# Patient Record
(demographics unavailable — no encounter records)

---

## 2025-02-04 NOTE — HISTORY OF PRESENT ILLNESS
[FreeTextEntry1] : fast heart beat.  and   This is a 52 year old woman with history of hashimotos,  and now graves,  osteoporosis,  and bicuspid aortic valve, history of syncope here  for palptiaITONS. she was sick for 2 weeks.  and she was getting better,  adn worse, adn getting better and worse.  adn seh  went to [facility].  stephan BP was high.  adn she was feeling very dyspnea on exertion .  her heart rate was 171.  and exteremely dyspena.  her fit bit was giving noticicaitons that her heart rate was jogh. no syncope.  she was in hospital for fast heart beat.  and she was diagnosed with hyperthyrpoidism.  she had regualr sinus tachycarida.  not an arrhythmia.  and now on beta blocker shje is controlled. her is also on methimazole.  she is feleign dyspnea on exertion   when she takes laundry up  and also she h=is feelign palpitaitons.         No smoking. No alcohol. No drugs.  Smoking:   Quit:  1 week ago.   2025    Smoked for _30 years.   Smoked 1 pack a day for  30 years.   Family history:  Father:  no myocardial infarction. no Cerebro vascular accident   enlarged heart  at 47.   smoker.  probably MI and enlarged heart.  Multiple sclerosis.  Mother :  no myocardial infarction. No cerebro vascular accident  Siblings:  No myocardial infarction.  No CVA

## 2025-02-04 NOTE — DISCUSSION/SUMMARY
[Patient] : the patient [Risks] : risks [Benefits] : benefits [Alternatives] : alternatives [With Me] : with me [___ Year(s)] : in [unfilled] year(s) [FreeTextEntry1] : This is a 52 year old woman with history of hashimotos,  and now graves,  osteoporosis,  and bicuspid aortic valve, history of syncope here  for palptiaITONS.  1) dyspnea on exertion : history of smoking.  r/o coronary artery disease . intermediate risk factors for coronary artery disease. Stress echocardiogram with contrast if needed.   Nuclear stress test with IV technetium radiotracer.  smoking cessation.   if negatvie work up, then consider PFT check with pulmonary to evaluate for reactive ariwqay disease and COPD.  2) palpitations:  Sinu stachycarida.  hyperthyrpoidism.  Part of thyroidisits due to infeciton.  now on methimazole.  in the mean time.  we will switch metoprolol to propranolol until thyroid stabilized. she has apt with endocrinolgoist.  stop metoiprolol.  discussed about worsening COPD.  no definite diagnosis of COPD.  will give her albuterl prn.  if . 3) laura fisher.    [EKG obtained to assist in diagnosis and management of assessed problem(s)] : EKG obtained to assist in diagnosis and management of assessed problem(s)

## 2025-02-28 NOTE — REVIEW OF SYSTEMS
[Fatigue] : fatigue [Recent Weight Gain (___ Lbs)] : recent weight gain: [unfilled] lbs [Visual Field Defect] : no visual field defect [Dysphagia] : no dysphagia [Chest Pain] : no chest pain [Palpitations] : no palpitations [Shortness Of Breath] : no shortness of breath [Nausea] : no nausea [Constipation] : no constipation [Vomiting] : no vomiting [Diarrhea] : no diarrhea [Polyuria] : no polyuria [Polydipsia] : no polydipsia

## 2025-02-28 NOTE — HISTORY OF PRESENT ILLNESS
[FreeTextEntry1] : Patient is here today for HFU after being admitted to [facility] for hyperthyroidism.  Hospital Course: Discharge Date 29-Jan-2025 Admission Date 28-Jan-2025 18:35 Reason for Admission Thyrotoxicosis Hospital Course  Mrs. Person is a 52-year-old female with a PMH of hypothyroidism/Hashimoto presents to the ED for SOB. Endorses intermittent URI symptoms for the past 2 weeks, 5 days prior to admission symptoms worsened with productive cough, congestion, fever, was prescribed Augmentin and prednisone by PMD, was feeling better, but day prior to admission started to have SOB with exertion, chest tightness, palpitation, fatigue which prompted ED visit. Found to be tachycardic / tachypneic with elevated thyroid function panel TSH < .1, Total T3 239, T4 13.2, given lopressor with improvement in HR. Seen by Endocrine with recommendation for Thyroid US and methimazole 20 mg BID while holding patients Levothyroxine. AM Labs showed improvement in TFT's Free thyroxine 3.1, T4 11.3, T3 total 190, TSH remains < .1. 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  Interval HX: C/o SOB on exertion Denies palpitations Headaches every day  Gained +10-15 pounds [facility] ehospitalization  Medical Hx: PCP was taking care of thyroid. Yearly appointments After further investigation of previous lab work from [facility], it seems patient has been in Hyperactive stage since 6/2023. Labs 6/2023 TSH <0.005 Labs 5/14/2024 TSH <0.005, T4 12.8 T3 223 TPO >600 Dose was never changed by PCP  She has h/o Hashimoto's Disease/Hypothyroidism for about 20 years  Was started on LT4 125 mcg and then lowered to LT4 50 mcg about 5 years ago and has been on dose since  Family Hx: Mother hypothyroidism  Labs from Hospital: 1/29/2025: TSH >0.10, T4 11.3, FT4 3.1, TSI 4.46  Thyroid u/s 1/29/2025 Thyroiditis, no nodules  Current Regimen: MMI 10 mg QD Metoporolol 25 mg 1 tab BID   Osteoporosis -Managed by GYN -Was on Alendronate 70 mg weekly x 2-3 years. did not feel well -Has been off since 2024 -DEXA in 5/2024, shoed Osteoporosis -On Ca and Vit D supplements

## 2025-02-28 NOTE — ASSESSMENT
[Methimazole Therapy/PTU Therapy] : Risks and benefits of methimazole therapy/PTU therapy were discussed with the patient, including rash, liver dysfunction, and agranulocytosis. Patient was instructed to call the office for flu-like symptoms (e.g. fever and sore-throat) [FreeTextEntry1] : Hyperthyroidism -Repeat TFTs now -Continue MMI 10 mg QD and Beta Blocker -Close follow up needed -Discussed possible WEBB ablation of thyroid if unable to manage thyroid with medication   Osteoporosis -Declines medication treatment at this time like Prolia and Tymlos -Continue Vit D and Ca supplements -F/u with GYN    RTO in 6 weeks NP and 4 months MD

## 2025-04-18 NOTE — REVIEW OF SYSTEMS
[Fatigue] : no fatigue [Recent Weight Gain (___ Lbs)] : recent weight gain: [unfilled] lbs [Visual Field Defect] : no visual field defect [Dysphagia] : no dysphagia [Chest Pain] : no chest pain [Palpitations] : no palpitations [Shortness Of Breath] : no shortness of breath [Nausea] : nausea [Constipation] : constipation [Diarrhea] : no diarrhea [Polyuria] : no polyuria [Stress] : stress

## 2025-04-18 NOTE — ASSESSMENT
[Methimazole Therapy/PTU Therapy] : Risks and benefits of methimazole therapy/PTU therapy were discussed with the patient, including rash, liver dysfunction, and agranulocytosis. Patient was instructed to call the office for flu-like symptoms (e.g. fever and sore-throat) [FreeTextEntry1] : Hyperthyroidism -Repeat TFTs now -Continue MMI 10 mg QD -Stop Beta blocker for now (She wanted to stop either BB or MMI)  -Close follow up needed -Discussed possible WEBB ablation of thyroid if unable to manage thyroid with medication -Discussed Total Thyroidectomy  - Pt wants to see ENT for Total Thyroidectomy becuase she wants thyroid out can not go on like this -Very emotional and crying at visit. She does not Feel well -Wants to try PTU --> Will discuss with Dr. Zhou  -Declines need to see GI for Acid Reflux and Abd pain  -Encouraged to see PCO for acid reflux   Osteoporosis -Declines medication treatment at this time like Prolia and Tymlos -Continue Vit D and Ca supplements -F/u with GYN   Nba discuss case with Dr. Zhou   RTO in 6 weeks NP and 4 months MD

## 2025-04-18 NOTE — HISTORY OF PRESENT ILLNESS
[FreeTextEntry1] : Patient was admitted to [facility] for hyperthyroidism in 1/2025  Notes:  Mrs. Person is a 52-year-old female with a PMH of hypothyroidism/Hashimoto presents to the ED for SOB. Found to betachycardic / tachypneic with elevated thyroid function panel TSH < .1, Total T3 239, T4 13.2, given lopressor with improvement in HR.  Was Seen by Endocrine with recommendation for Thyroid US and methimazole 20 mg BID while holding patients Levothyroxine. AM Labs showed improvement in TFT's Free thyroxine 3.1, T4 11.3, T3 total 190, TSH remains < .1. Thyroid US reviewed with endocrine shows increased vascularity,   Interval HX: Headaches every day  Keeps gaining weight After increasing to MMI 15 mg last visit C/o Acid reflux, nausea, unable to bend over without pain and wanting to throw up  She mentally does not feel well, she is van, mean, not feeling herself  - Medicaton was decreased back to MMI 10 mg 2 weeks later Extreme Constipation  Medical Hx: PCP was taking care of thyroid. Yearly appointments After further investigation of previous lab work from [facility], it seems patient has been in Hyperactive stage since 6/2023. Labs 6/2023 TSH <0.005 Labs 5/14/2024 TSH <0.005, T4 12.8 T3 223 TPO >600 Dose was never changed by PCP  She has h/o Hashimoto's Disease/Hypothyroidism for about 20 years  Was started on LT4 125 mcg and then lowered to LT4 50 mcg about 5 years ago and has been on dose since  Family Hx: Mother hypothyroidism  Labs from Hospital: 1/29/2025: TSH >0.10, T4 11.3, FT4 3.1, TSI 4.46  Current TFTs: 4/9/2025 TSH 0.31 FT4 0.62 T4 4.2 T3 2.7  Thyroid u/s 1/29/2025 Thyroiditis, no nodules  Current Regimen: MMI 10 mg QD Metoprolol 25 mg 1 tab BID   Osteoporosis -Managed by GYN -Was on Alendronate 70 mg weekly x 2-3 years. did not feel well -Has been off since 2024 -DEXA in 5/2024, showed Osteoporosis -On Ca and Vit D supplements

## 2025-05-30 NOTE — HISTORY OF PRESENT ILLNESS
[FreeTextEntry1] : Patient was admitted to [facility] for hyperthyroidism in 1/2025  Notes:  Mrs. Person is a 52-year-old female with a PMH of hypothyroidism/Hashimoto presents to the ED for SOB. Found to betachycardic / tachypneic with elevated thyroid function panel TSH < .1, Total T3 239, T4 13.2, given lopressor with improvement in HR.  Was Seen by Endocrine with recommendation for Thyroid US and methimazole 20 mg BID while holding patients Levothyroxine. AM Labs showed improvement in TFT's Free thyroxine 3.1, T4 11.3, T3 total 190, TSH remains < .1. Thyroid US reviewed with endocrine shows increased vascularity,   Interval HX: She is feeling much better Headahes are happening less Mood has improved Some Constipation  Medical Hx: PCP was taking care of thyroid. Yearly appointments After further investigation of previous lab work from [facility], it seems patient has been in Hyperactive stage since 6/2023. Labs 6/2023 TSH <0.005 Labs 5/14/2024 TSH <0.005, T4 12.8 T3 223 TPO >600 Dose was never changed by PCP  She has h/o Hashimoto's Disease/Hypothyroidism for about 20 years  Was started on LT4 125 mcg and then lowered to LT4 50 mcg about 5 years ago and has been on dose since  Family Hx: Mother hypothyroidism   Current TFTs: 5/16/2025 TSH <0.005 FT4 1.15 FT3 4.8 PCP told her to STOP MMI for 3 days before labs    Thyroid u/s 1/29/2025 Thyroiditis, no nodules  Current Regimen: MMI 10 mg QD Metoprolol 25 mg 1 tab BID   Osteoporosis -Managed by GYN -Was on Alendronate 70 mg weekly x 2-3 years. did not feel well -Has been off since 2024 -DEXA in 5/2024, showed Osteoporosis -On Ca and Vit D supplements -Declines treatment

## 2025-05-30 NOTE — REASON FOR VISIT
[Follow - Up] : a follow-up visit [Hypothyroidism] : hypothyroidism [Home] : at home, [unfilled] , at the time of the visit. [Other Location: e.g. Home (Enter Location, City,State)___] : at [unfilled] [Telephone (audio)] : This telephonic visit was provided via audio only technology. [Verbal consent obtained from patient] : the patient, [unfilled]

## 2025-05-30 NOTE — REVIEW OF SYSTEMS
[Constipation] : constipation [Fatigue] : no fatigue [Visual Field Defect] : no visual field defect [Dysphagia] : no dysphagia [Chest Pain] : no chest pain [Palpitations] : no palpitations [Shortness Of Breath] : no shortness of breath [Nausea] : no nausea [Vomiting] : no vomiting [Polyuria] : no polyuria

## 2025-05-30 NOTE — ASSESSMENT
[FreeTextEntry1] : Hyperthyroidism -Continue MMI 10 mg QD -Close follow up needed -Discussed possible WEBB ablation of thyroid if unable to manage thyroid with medication -Do not stop medication for labs -Repeat TFTs in 4 weeks -Avoid alcohol  Osteoporosis -Declines medication treatment at this time like Prolia and Tymlos -Continue Vit D and Ca supplements -F/u with GYN   RTO in 8 weeks MD

## 2025-07-02 NOTE — PHYSICAL EXAM
[de-identified] : Exam [bilateral] wrist + Durkan's with + N/T. There is + tinel. Patient is able to delineate numbness to median-sided digits volarly. [No obvious thenar atrophy] Examination of the hand(s)  particularly at the A1 of the left small, right ring reveals tenderness with a palpable click.  [de-identified] : [4] views of [bilateral hands and wrists] were obtained today in my office and were seen by me and discussed with the patient.  These [show findings consistent with minimal bilateral basal joint OA and findings of IP joint OA]

## 2025-07-02 NOTE — ASSESSMENT
[FreeTextEntry1] : ASSESSMENT The patient comes in today with chronic exacerbated complaints of bilateral wrist and hand discomfort.  We have discussed carpal tunnel.  We have discussed tendinopathy and triggering of the fingers.  We have discussed bracing activity modification and surgical modalities as well.  Patient elects for injections today which should be very helpful   The patient was adequately and thoroughly informed of my assessment of their current condition(s).  - This may diminish bodily function for the extremity. We discussed prognosis, tx modalities including operative and nonoperative options for the above diagnostic assessment. As always, 2nd opinion is always provided as an option. When accessible, I was able to review other physicians note(s) including reviewing other tests, imaging results as well as personally view these results for my own interpretation.   *** Injection procedure for bilateral carpal tunnel: The risks and benefits of a steroid injection were discussed in detail. The risks include but are not limited to: pain, infection, swelling, flare response, bleeding, subcutaneous fat atrophy, skin depigmentation and/or elevation of blood sugar. The risk of incomplete resolution of symptoms, recurrence and additional intervention was reviewed and considered by the patient. The patient agreed to proceed and under a sterile prep, I injected 1 unit 6mg into 1 cc of a combination of Celestone and Lidocaine into the above stated location for the procedure. The patient tolerated the injection well.  *** Injection procedure for trigger tendon sheath x 2-left small trigger, right ring trigger: The risks and benefits of a steroid injection were discussed in detail. The risks include but are not limited to: pain, infection, swelling, flare response, bleeding, subcutaneous fat atrophy, skin depigmentation and/or elevation of blood sugar. The risk of incomplete resolution of symptoms, recurrence and additional intervention was reviewed and considered by the patient. The patient agreed to proceed and under a sterile prep, I injected 1 unit 6mg into 1 cc of a combination of Celestone and Lidocaine into the above stated location for the procedure. The patient tolerated the injection well.   The patient was adequately and thoroughly informed of my assessment of their current condition(s).   DISCUSSION: 1.  Injections as above.  Bracing activity modification.  Follow-up [6 weeks] 2. [x] 3. [x]

## 2025-07-02 NOTE — HISTORY OF PRESENT ILLNESS
[FreeTextEntry1] : This is a pleasant 53 year yo patient presenting today with a chronic history of bilateral wrist and hand discomfort.  Describes burning and numbness and tingling that wakes them up at night.  Describes stiffness and triggering of fingers as well.  It is affecting ADLs.

## 2025-07-30 NOTE — REVIEW OF SYSTEMS
[Constipation] : constipation [Recent Weight Gain (___ Lbs)] : recent weight gain: [unfilled] lbs [Nausea] : nausea [Fatigue] : no fatigue [Visual Field Defect] : no visual field defect [Dysphagia] : no dysphagia [Chest Pain] : no chest pain [Palpitations] : no palpitations [Shortness Of Breath] : no shortness of breath [Vomiting] : no vomiting [Polyuria] : no polyuria

## 2025-07-30 NOTE — HISTORY OF PRESENT ILLNESS
[FreeTextEntry1] : Zakiya is a 53 yr old female, here for hyperthyroidism, seen NP in past seeing me first time.  Patient was admitted to [facility] for hyperthyroidism in 1/2025  Past Notes:  ''Per patient she was diagnosed with hypothyroidism in 2003, and had been on levothyroxine till recently when she came to [facility]  in  1/25 for tachycardia and SOB. Mrs. Person is a 52-year-old female with a PMH of hypothyroidism/Hashimoto presents to the ED for SOB. Found to Tachycardic / tachypneic with elevated thyroid function panel TSH < .1, Total T3 239, T4 13.2, given lopressor with improvement in HR.  Was Seen by Endocrine with recommendation for Thyroid US and methimazole 20 mg BID while holding patients Levothyroxine. AM Labs showed improvement in TFT's Free thyroxine 3.1, T4 11.3, T3 total 190, TSH remains < .1. Thyroid US reviewed with endocrine shows increased vascularity.''  Interval HX: She is not feeling good, now has weight gain, headaches, nausea, tired, having hair loss. She has a lot of mood issues, anger issues. Does not feel good. She says she feels drastic change in her life.  Medical Hx: PCP was taking care of thyroid. Yearly appointments After further investigation of previous lab work from [facility], it seems patient has been in Hyperactive stage since 6/2023. Labs 6/2023 TSH <0.005 Labs 5/14/2024 TSH <0.005, T4 12.8 T3 223 TPO >600 Dose was never changed by PCP  She has h/o Hashimoto's Disease/Hypothyroidism for about 20 years  Was started on LT4 125 mcg and then lowered to LT4 50 mcg about 5 years ago and has been on dose since  Family Hx: Mother hypothyroidism   Current TFTs:  7/25: TSH: 0.10 FT4: 1.08 T3: 3.3   5/16/2025 TSH <0.005 FT4 1.15 FT3 4.8 PCP told her to STOP MMI for 3 days before labs    Thyroid u/s 1/29/2025 Thyroiditis, no nodules  Current Regimen: MMI 10 mg QD Metoprolol 25 mg 1 tab BID   Osteoporosis -Managed by GYN -Was on Alendronate 70 mg weekly x 2-3 years. did not feel well -Has been off since 2024 -DEXA in 5/2024, showed Osteoporosis -On Ca and Vit D supplements -Declines treatment

## 2025-07-30 NOTE — ASSESSMENT
[FreeTextEntry1] : Zakiya is a 53 yr old female, here for hyperthyroidism, seen NP in past seeing me first time.  Patient was admitted to [facility] for hyperthyroidism in 1/2025. Per patient she was diagnosed with hypothyroidism in 2003, and had been on levothyroxine till recently when she came to [facility]  in  1/25 for tachycardia and SOB. She is not feeling good, now has weight gain, headaches, nausea, tired, having hair loss. She has a lot of mood issues, anger issues. Does not feel good. She says she feels drastic change in her life.   She is very frustrated in clinic today, wants her thyroid to be out. discussed with her in detail, will give it a try for few more weeks and see how she feels after her thyroid functions normalize,  if she still feels same will then send her  to surgeon.  Hyperthyroidism -To go up on MMI 15 mg QD -Close follow up needed -Discussed possible surgery of thyroid if unable to manage thyroid with medication -Do not stop medication for labs -Repeat TFTs in 6 weeks -Avoid alcohol   Lump felt in thyroid: had US in 1/25 , no nodules seen, but lump felt on exam today, will get ultrasound  Osteoporosis -Declines medication treatment at this time like Prolia and Tymlos -Continue Vit D and Ca supplements -F/u with GYN   RTO in 8 weeks with NP , 6 months with me

## 2025-07-30 NOTE — PHYSICAL EXAM
[Alert] : alert [Well Nourished] : well nourished [No Acute Distress] : no acute distress [No Neck Mass] : no neck mass was observed [No Respiratory Distress] : no respiratory distress [No Accessory Muscle Use] : no accessory muscle use [Clear to Auscultation] : lungs were clear to auscultation bilaterally [Normal S1, S2] : normal S1 and S2 [Normal Rate] : heart rate was normal [Regular Rhythm] : with a regular rhythm [Normal Bowel Sounds] : normal bowel sounds [Not Tender] : non-tender [Soft] : abdomen soft [No Tremors] : no tremors [Oriented x3] : oriented to person, place, and time [Normal Affect] : the affect was normal [Normal Insight/Judgement] : insight and judgment were intact [Normal Mood] : the mood was normal [de-identified] : lump felt in thyroid